# Patient Record
Sex: FEMALE | Employment: FULL TIME
[De-identification: names, ages, dates, MRNs, and addresses within clinical notes are randomized per-mention and may not be internally consistent; named-entity substitution may affect disease eponyms.]

---

## 2017-01-18 ENCOUNTER — TELEPHONE (OUTPATIENT)
Dept: FAMILY MEDICINE CLINIC | Facility: CLINIC | Age: 57
End: 2017-01-18

## 2017-01-19 RX ORDER — LEVOFLOXACIN 500 MG/1
500 TABLET, FILM COATED ORAL DAILY
Qty: 10 TABLET | Refills: 0 | Status: SHIPPED | OUTPATIENT
Start: 2017-01-19 | End: 2017-01-29

## 2017-01-19 RX ORDER — DEXTROMETHORPHAN HYDROBROMIDE AND PROMETHAZINE HYDROCHLORIDE 15; 6.25 MG/5ML; MG/5ML
5 SYRUP ORAL 4 TIMES DAILY PRN
Qty: 240 ML | Refills: 0 | Status: SHIPPED | OUTPATIENT
Start: 2017-01-19 | End: 2017-01-26

## 2017-02-14 RX ORDER — LEVOTHYROXINE SODIUM 0.1 MG/1
TABLET ORAL
Qty: 90 TABLET | Refills: 1 | Status: SHIPPED | OUTPATIENT
Start: 2017-02-14 | End: 2019-11-02

## 2017-05-04 DIAGNOSIS — E03.9 HYPOTHYROIDISM, UNSPECIFIED TYPE: Primary | ICD-10-CM

## 2017-05-04 RX ORDER — LEVOTHYROXINE SODIUM 0.15 MG/1
150 TABLET ORAL DAILY
Qty: 30 TABLET | Refills: 0 | Status: SHIPPED | OUTPATIENT
Start: 2017-05-04

## 2017-06-06 ENCOUNTER — OFFICE VISIT (OUTPATIENT)
Dept: FAMILY MEDICINE CLINIC | Age: 57
End: 2017-06-06
Payer: COMMERCIAL

## 2017-06-06 VITALS
BODY MASS INDEX: 24.8 KG/M2 | RESPIRATION RATE: 18 BRPM | WEIGHT: 158 LBS | TEMPERATURE: 98.6 F | HEIGHT: 67 IN | DIASTOLIC BLOOD PRESSURE: 95 MMHG | OXYGEN SATURATION: 98 % | HEART RATE: 83 BPM | SYSTOLIC BLOOD PRESSURE: 124 MMHG

## 2017-06-06 DIAGNOSIS — J30.9 ALLERGIC SINUSITIS: Primary | ICD-10-CM

## 2017-06-06 PROCEDURE — 3017F COLORECTAL CA SCREEN DOC REV: CPT | Performed by: PHYSICIAN ASSISTANT

## 2017-06-06 PROCEDURE — G8420 CALC BMI NORM PARAMETERS: HCPCS | Performed by: PHYSICIAN ASSISTANT

## 2017-06-06 PROCEDURE — 99212 OFFICE O/P EST SF 10 MIN: CPT | Performed by: PHYSICIAN ASSISTANT

## 2017-06-06 PROCEDURE — 4004F PT TOBACCO SCREEN RCVD TLK: CPT | Performed by: PHYSICIAN ASSISTANT

## 2017-06-06 PROCEDURE — 3014F SCREEN MAMMO DOC REV: CPT | Performed by: PHYSICIAN ASSISTANT

## 2017-06-06 PROCEDURE — G8427 DOCREV CUR MEDS BY ELIG CLIN: HCPCS | Performed by: PHYSICIAN ASSISTANT

## 2017-07-31 RX ORDER — ALBUTEROL SULFATE 90 UG/1
1-2 AEROSOL, METERED RESPIRATORY (INHALATION) EVERY 6 HOURS PRN
Qty: 3 INHALER | Refills: 0 | Status: SHIPPED | OUTPATIENT
Start: 2017-07-31

## 2019-11-02 ENCOUNTER — OFFICE VISIT (OUTPATIENT)
Dept: FAMILY MEDICINE CLINIC | Age: 59
End: 2019-11-02
Payer: COMMERCIAL

## 2019-11-02 ENCOUNTER — HOSPITAL ENCOUNTER (OUTPATIENT)
Age: 59
Setting detail: SPECIMEN
Discharge: HOME | End: 2019-11-02
Payer: COMMERCIAL

## 2019-11-02 VITALS
OXYGEN SATURATION: 98 % | HEART RATE: 74 BPM | TEMPERATURE: 98 F | WEIGHT: 146 LBS | DIASTOLIC BLOOD PRESSURE: 77 MMHG | RESPIRATION RATE: 18 BRPM | SYSTOLIC BLOOD PRESSURE: 134 MMHG | BODY MASS INDEX: 22.92 KG/M2

## 2019-11-02 DIAGNOSIS — J02.9 SORE THROAT: ICD-10-CM

## 2019-11-02 DIAGNOSIS — J06.9 VIRAL URI: Primary | ICD-10-CM

## 2019-11-02 LAB — S PYO AG THROAT QL: NORMAL

## 2019-11-02 PROCEDURE — 99202 OFFICE O/P NEW SF 15 MIN: CPT | Performed by: NURSE PRACTITIONER

## 2019-11-02 PROCEDURE — 87880 STREP A ASSAY W/OPTIC: CPT | Performed by: NURSE PRACTITIONER

## 2019-11-02 ASSESSMENT — ENCOUNTER SYMPTOMS
COUGH: 1
SHORTNESS OF BREATH: 0
RHINORRHEA: 1
DIARRHEA: 0
EYE DISCHARGE: 0
VOMITING: 0
WHEEZING: 0
SORE THROAT: 1

## 2019-11-04 LAB
DIRECT EXAM: NORMAL
Lab: NORMAL
SPECIMEN DESCRIPTION: NORMAL

## 2021-10-28 ENCOUNTER — OFFICE VISIT (OUTPATIENT)
Dept: PRIMARY CARE CLINIC | Age: 61
End: 2021-10-28
Payer: COMMERCIAL

## 2021-10-28 VITALS
BODY MASS INDEX: 22.76 KG/M2 | HEART RATE: 84 BPM | DIASTOLIC BLOOD PRESSURE: 84 MMHG | TEMPERATURE: 97.2 F | SYSTOLIC BLOOD PRESSURE: 131 MMHG | OXYGEN SATURATION: 98 % | HEIGHT: 67 IN | WEIGHT: 145 LBS

## 2021-10-28 DIAGNOSIS — J02.9 SORE THROAT: ICD-10-CM

## 2021-10-28 DIAGNOSIS — J40 BRONCHITIS: Primary | ICD-10-CM

## 2021-10-28 LAB — S PYO AG THROAT QL: NORMAL

## 2021-10-28 PROCEDURE — 99213 OFFICE O/P EST LOW 20 MIN: CPT | Performed by: NURSE PRACTITIONER

## 2021-10-28 PROCEDURE — 87880 STREP A ASSAY W/OPTIC: CPT | Performed by: NURSE PRACTITIONER

## 2021-10-28 RX ORDER — AZITHROMYCIN 250 MG/1
TABLET, FILM COATED ORAL
Qty: 1 PACKET | Refills: 0 | Status: SHIPPED | OUTPATIENT
Start: 2021-10-28

## 2021-10-28 RX ORDER — PREDNISONE 20 MG/1
40 TABLET ORAL DAILY
Qty: 10 TABLET | Refills: 0 | Status: SHIPPED | OUTPATIENT
Start: 2021-10-28 | End: 2021-11-02

## 2021-10-28 RX ORDER — BENZONATATE 200 MG/1
200 CAPSULE ORAL 3 TIMES DAILY PRN
Qty: 30 CAPSULE | Refills: 0 | Status: SHIPPED | OUTPATIENT
Start: 2021-10-28 | End: 2021-11-04

## 2021-10-28 ASSESSMENT — ENCOUNTER SYMPTOMS
SHORTNESS OF BREATH: 0
WHEEZING: 0
EYE DISCHARGE: 0
VOICE CHANGE: 0
COUGH: 1
SINUS PRESSURE: 0
CHEST TIGHTNESS: 0
SORE THROAT: 0
EYE REDNESS: 0

## 2021-10-28 NOTE — PATIENT INSTRUCTIONS
Patient Education        Bronchitis: Care Instructions  Your Care Instructions     Bronchitis is inflammation of the bronchial tubes, which carry air to the lungs. The tubes swell and produce mucus, or phlegm. The mucus and inflamed bronchial tubes make you cough. You may have trouble breathing. Most cases of bronchitis are caused by viruses like those that cause colds. Antibiotics usually do not help and they may be harmful. Bronchitis usually develops rapidly and lasts about 2 to 3 weeks in otherwise healthy people. Follow-up care is a key part of your treatment and safety. Be sure to make and go to all appointments, and call your doctor if you are having problems. It's also a good idea to know your test results and keep a list of the medicines you take. How can you care for yourself at home? · Take all medicines exactly as prescribed. Call your doctor if you think you are having a problem with your medicine. · Get some extra rest.  · Take an over-the-counter pain medicine, such as acetaminophen (Tylenol), ibuprofen (Advil, Motrin), or naproxen (Aleve) to reduce fever and relieve body aches. Read and follow all instructions on the label. · Do not take two or more pain medicines at the same time unless the doctor told you to. Many pain medicines have acetaminophen, which is Tylenol. Too much acetaminophen (Tylenol) can be harmful. · Take an over-the-counter cough medicine to help quiet a dry, hacking cough so that you can sleep. Avoid cough medicines that have more than one active ingredient. Read and follow all instructions on the label. · Do not smoke. Smoking can make bronchitis worse. If you need help quitting, talk to your doctor about stop-smoking programs and medicines. These can increase your chances of quitting for good. When should you call for help? Call 911 anytime you think you may need emergency care. For example, call if:    · You have severe trouble breathing.    Call your doctor now or seek immediate medical care if:    · You have new or worse trouble breathing.     · You cough up dark brown or bloody mucus (sputum).     · You have a new or higher fever.     · You have a new rash. Watch closely for changes in your health, and be sure to contact your doctor if:    · You cough more deeply or more often, especially if you notice more mucus or a change in the color of your mucus.     · You are not getting better as expected. Where can you learn more? Go to https://Captual.Picturelife. org and sign in to your Reel[employer]fer account. Enter H333 in the [employer] box to learn more about \"Bronchitis: Care Instructions. \"     If you do not have an account, please click on the \"Sign Up Now\" link. Current as of: July 6, 2021               Content Version: 13.0  © 7047-7260 Healthwise, Incorporated. Care instructions adapted under license by [facility] ([facility]). If you have questions about a medical condition or this instruction, always ask your healthcare professional. [address] any warranty or liability for your use of this information.

## 2021-12-09 ENCOUNTER — OFFICE VISIT (OUTPATIENT)
Dept: PRIMARY CARE CLINIC | Age: 61
End: 2021-12-09
Payer: COMMERCIAL

## 2021-12-09 VITALS
HEART RATE: 92 BPM | DIASTOLIC BLOOD PRESSURE: 82 MMHG | WEIGHT: 145 LBS | TEMPERATURE: 97.3 F | OXYGEN SATURATION: 96 % | BODY MASS INDEX: 22.76 KG/M2 | SYSTOLIC BLOOD PRESSURE: 131 MMHG | HEIGHT: 67 IN

## 2021-12-09 DIAGNOSIS — B96.89 ACUTE BACTERIAL SINUSITIS: Primary | ICD-10-CM

## 2021-12-09 DIAGNOSIS — J01.90 ACUTE BACTERIAL SINUSITIS: Primary | ICD-10-CM

## 2021-12-09 PROCEDURE — 99213 OFFICE O/P EST LOW 20 MIN: CPT | Performed by: NURSE PRACTITIONER

## 2021-12-09 RX ORDER — AZITHROMYCIN 250 MG/1
TABLET, FILM COATED ORAL
Qty: 1 PACKET | Refills: 0 | Status: SHIPPED | OUTPATIENT
Start: 2021-12-09

## 2021-12-09 RX ORDER — PREDNISONE 20 MG/1
40 TABLET ORAL DAILY
Qty: 10 TABLET | Refills: 0 | Status: SHIPPED | OUTPATIENT
Start: 2021-12-09 | End: 2021-12-14

## 2021-12-09 ASSESSMENT — PATIENT HEALTH QUESTIONNAIRE - PHQ9
1. LITTLE INTEREST OR PLEASURE IN DOING THINGS: 0
2. FEELING DOWN, DEPRESSED OR HOPELESS: 0
SUM OF ALL RESPONSES TO PHQ9 QUESTIONS 1 & 2: 0
SUM OF ALL RESPONSES TO PHQ QUESTIONS 1-9: 0

## 2021-12-09 ASSESSMENT — ENCOUNTER SYMPTOMS
SINUS PRESSURE: 1
CHEST TIGHTNESS: 0
VOICE CHANGE: 0
SORE THROAT: 0
WHEEZING: 0
COUGH: 1
EYE DISCHARGE: 0
SHORTNESS OF BREATH: 0
EYE REDNESS: 0

## 2021-12-09 NOTE — PROGRESS NOTES
[facility] [id number] [facility] [place] [facility]  [address] [address]  Dept: [phone]  Dept Fax: [phone]     Angel Thornton. Ledy Collins is a 59 y.o. female who presents to the urgent care today for her medicalconditions/complaints as noted below. Angel Thornton. Ledy Collins is c/o of Sinusitis (pt has been having sinus congestion and has been having slight cough with pain in her right earX 2 days )    HPI:      Sinusitis  This is a new problem. Episode onset: 2 days ago. The problem is unchanged. There has been no fever. Associated symptoms include congestion, coughing, ear pain (right), headaches and sinus pressure. Pertinent negatives include no chills, shortness of breath, sneezing or sore throat. Treatments tried: otc tx. The treatment provided no relief. Daughter also recently ill.     Past Medical History:   Diagnosis Date    GERD (gastroesophageal reflux disease)     Hypothyroidism     Irritable bowel syndrome     Postmenopausal     about 10 yrs ago      Current Outpatient Medications   Medication Sig Dispense Refill    azithromycin (ZITHROMAX Z-EMMA) 250 MG tablet Take 2 tabs on day 1 followed by 1 tab on days 2-5. 1 packet 0    predniSONE (DELTASONE) 20 MG tablet Take 2 tablets by mouth daily for 5 days 10 tablet 0    azithromycin (ZITHROMAX Z-EMMA) 250 MG tablet Take 2 tabs on day 1 followed by 1 tab on days 2-5. 1 packet 0    albuterol sulfate HFA (PROAIR HFA) 108 (90 Base) MCG/ACT inhaler Inhale 1-2 puffs into the lungs every 6 hours as needed for Wheezing 3 Inhaler 0    levothyroxine (LEVOTHROID) 150 MCG tablet Take 1 tablet by mouth daily 30 tablet 0    montelukast (SINGULAIR) 10 MG tablet TAKE 1 TABLET DAILY 90 tablet 3    metoclopramide (REGLAN) 10 MG tablet Take 1 tablet by mouth daily 90 tablet 3    alendronate (FOSAMAX) 35 MG tablet TAKE 1 TABLET BY MOUTH EVERY WEEK 4 tablet 2    fluticasone (FLONASE) 50 MCG/ACT nasal spray 1 spray by Nasal route daily 1 Bottle 3  Loratadine (CLARITIN) 10 MG CAPS Take 1 tablet by mouth daily. No current facility-administered medications for this visit. No Known Allergies    Reviewed PMH, SH, and FH with the patient and updated. Subjective:      Review of Systems   Constitutional: Negative for chills, fatigue and fever. HENT: Positive for congestion, ear pain (right), postnasal drip and sinus pressure. Negative for ear discharge, sneezing, sore throat and voice change. Eyes: Negative for discharge and redness. Respiratory: Positive for cough. Negative for chest tightness, shortness of breath and wheezing. Cardiovascular: Negative. Negative for chest pain. Musculoskeletal: Negative for myalgias. Skin: Negative for rash. Neurological: Positive for headaches. Negative for dizziness, weakness and light-headedness. Hematological: Negative for adenopathy. All other systems reviewed and are negative. Objective:      Physical Exam  Vitals and nursing note reviewed. Constitutional:       General: She is not in acute distress. Appearance: Normal appearance. She is well-developed. She is not ill-appearing, toxic-appearing or diaphoretic. HENT:      Head: Normocephalic. Right Ear: External ear normal. A middle ear effusion is present. Left Ear: Tympanic membrane and external ear normal.      Nose: Nose normal.      Right Sinus: No maxillary sinus tenderness or frontal sinus tenderness. Left Sinus: No maxillary sinus tenderness or frontal sinus tenderness. Mouth/Throat:      Pharynx: Oropharyngeal exudate (PNd) and posterior oropharyngeal erythema present. Eyes:      General:         Right eye: No discharge. Left eye: No discharge. Cardiovascular:      Rate and Rhythm: Normal rate and regular rhythm. Heart sounds: Normal heart sounds. No murmur heard. Pulmonary:      Effort: Pulmonary effort is normal. No respiratory distress.       Breath sounds: Normal breath sounds. No wheezing or rales. Lymphadenopathy:      Cervical: No cervical adenopathy. Skin:     General: Skin is warm. Findings: No rash. Neurological:      Mental Status: She is alert. /82 (Site: Left Upper Arm, Position: Sitting, Cuff Size: Medium Adult)   Pulse 92   Temp 97.3 °F (36.3 °C) (Tympanic)   Ht 5' 7\" (1.702 m)   Wt 145 lb (65.8 kg)   SpO2 96%   Breastfeeding No   BMI 22.71 kg/m²     Assessment:       Diagnosis Orders   1. Acute bacterial sinusitis  azithromycin (ZITHROMAX Z-EMMA) 250 MG tablet    predniSONE (DELTASONE) 20 MG tablet     Plan:      Based on the severity of the symptoms-- I will treat this as bacterial at this time. Patient instructed to complete antibiotic prescription fully. Prednisone sent to the pharmacy to help enable symptom relief. Patient declined COVID testing at this time. May use Motrin/Tylenol for fever/pain. Saline washes, salt water gargles and over the counter preparations if desired. Patient agreeable to treatment plan. Educational materials provided on AVS.  Follow up if symptoms do not improve/worsen. Orders Placed This Encounter   Medications    azithromycin (ZITHROMAX Z-EMMA) 250 MG tablet     Sig: Take 2 tabs on day 1 followed by 1 tab on days 2-5. Dispense:  1 packet     Refill:  0    predniSONE (DELTASONE) 20 MG tablet     Sig: Take 2 tablets by mouth daily for 5 days     Dispense:  10 tablet     Refill:  0        Patient given educational materials - see patient instructions. Discussed use, benefit, and side effects of prescribed medications. All patientquestions answered. Pt voiced understanding.     Electronically signed by HUNG Mendieta CNP on 12/9/2021at 9:09 AM

## 2021-12-09 NOTE — PATIENT INSTRUCTIONS

## 2022-02-01 ENCOUNTER — OFFICE VISIT (OUTPATIENT)
Dept: PRIMARY CARE CLINIC | Age: 62
End: 2022-02-01
Payer: COMMERCIAL

## 2022-02-01 VITALS
BODY MASS INDEX: 22.76 KG/M2 | HEART RATE: 97 BPM | HEIGHT: 67 IN | DIASTOLIC BLOOD PRESSURE: 85 MMHG | SYSTOLIC BLOOD PRESSURE: 124 MMHG | OXYGEN SATURATION: 94 % | TEMPERATURE: 98.4 F | WEIGHT: 145 LBS

## 2022-02-01 DIAGNOSIS — J01.90 ACUTE NON-RECURRENT SINUSITIS, UNSPECIFIED LOCATION: ICD-10-CM

## 2022-02-01 DIAGNOSIS — H66.90 ACUTE OTITIS MEDIA, UNSPECIFIED OTITIS MEDIA TYPE: Primary | ICD-10-CM

## 2022-02-01 PROCEDURE — 99213 OFFICE O/P EST LOW 20 MIN: CPT | Performed by: FAMILY MEDICINE

## 2022-02-01 RX ORDER — AMOXICILLIN AND CLAVULANATE POTASSIUM 875; 125 MG/1; MG/1
1 TABLET, FILM COATED ORAL 2 TIMES DAILY
Qty: 14 TABLET | Refills: 0 | Status: SHIPPED | OUTPATIENT
Start: 2022-02-01 | End: 2022-02-08

## 2022-02-01 ASSESSMENT — ENCOUNTER SYMPTOMS
COUGH: 0
VOMITING: 0
ABDOMINAL PAIN: 0
DIARRHEA: 1
SORE THROAT: 0

## 2022-02-01 NOTE — PROGRESS NOTES
[address] [place] [facility]  [address] [place] [address]  Dept: [phone]  Dept Fax: [phone]    Naz Harris is a 59 y.o. female who presents today for her medical conditions/complaintsas noted below. Naz Harris is c/o of Otalgia (pt ha been having ear pain on the right side since this morning)        HPI:     Otalgia   There is pain in the right ear. This is a new problem. The current episode started today. The problem occurs constantly. The problem has been unchanged. There has been no fever. Associated symptoms include diarrhea and hearing loss (muffled). Pertinent negatives include no abdominal pain, coughing, ear discharge, rash, sore throat or vomiting. Associated symptoms comments: headache. She has tried nothing for the symptoms. The treatment provided no relief. There is no history of a tympanostomy tube.    PMHx of allergies, recurrent ear infections  Patient declines covid testing at this time  Past Medical History:   Diagnosis Date    GERD (gastroesophageal reflux disease)     Hypothyroidism     Irritable bowel syndrome     Postmenopausal     about 10 yrs ago    Past medical history reviewed and pertinent positives/negatives in the HPI    Past Surgical History:   Procedure Laterality Date    ADENOIDECTOMY       SECTION       COLONOSCOPY  2011    normal    LAPAROSCOPY      OTHER SURGICAL HISTORY      plate in right arm    SMALL INTESTINE SURGERY      strangulated bowel    TONSILLECTOMY AND ADENOIDECTOMY  1963    UPPER GASTROINTESTINAL ENDOSCOPY  2010       Family History   Problem Relation Age of Onset    Other Mother     Other Father        Social History     Tobacco Use    Smoking status: Current Every Day Smoker     Packs/day: 0.50     Years: 30.00     Pack years: 15.00     Types: Cigarettes     Start date:     Smokeless tobacco: Never Used   Substance Use Topics    Alcohol use: Yes     Comment: social      Current Outpatient Medications   Medication Sig Dispense Refill    amoxicillin-clavulanate (AUGMENTIN) 875-125 MG per tablet Take 1 tablet by mouth 2 times daily for 7 days 14 tablet 0    azithromycin (ZITHROMAX Z-EMMA) 250 MG tablet Take 2 tabs on day 1 followed by 1 tab on days 2-5. 1 packet 0    azithromycin (ZITHROMAX Z-EMMA) 250 MG tablet Take 2 tabs on day 1 followed by 1 tab on days 2-5. 1 packet 0    albuterol sulfate HFA (PROAIR HFA) 108 (90 Base) MCG/ACT inhaler Inhale 1-2 puffs into the lungs every 6 hours as needed for Wheezing 3 Inhaler 0    levothyroxine (LEVOTHROID) 150 MCG tablet Take 1 tablet by mouth daily 30 tablet 0    montelukast (SINGULAIR) 10 MG tablet TAKE 1 TABLET DAILY 90 tablet 3    metoclopramide (REGLAN) 10 MG tablet Take 1 tablet by mouth daily 90 tablet 3    alendronate (FOSAMAX) 35 MG tablet TAKE 1 TABLET BY MOUTH EVERY WEEK 4 tablet 2    fluticasone (FLONASE) 50 MCG/ACT nasal spray 1 spray by Nasal route daily 1 Bottle 3    Loratadine (CLARITIN) 10 MG CAPS Take 1 tablet by mouth daily. No current facility-administered medications for this visit. No Known Allergies    Health Maintenance   Topic Date Due    Hepatitis C screen  Never done    HIV screen  Never done    Shingles Vaccine (1 of 2) Never done    TSH testing  12/08/2017    Cervical cancer screen  08/16/2019    Breast cancer screen  12/03/2020    Lipid screen  03/17/2021    Flu vaccine (1) 09/01/2021    Colon cancer screen colonoscopy  09/04/2022    Depression Screen  12/09/2022    Pneumococcal 0-64 years Vaccine (2 of 2 - PPSV23) 09/04/2023    DTaP/Tdap/Td vaccine (2 - Td or Tdap) 09/01/2030    COVID-19 Vaccine  Completed    Hepatitis A vaccine  Aged Out    Hepatitis B vaccine  Aged Out    Hib vaccine  Aged Out    Meningococcal (ACWY) vaccine  Aged Out       :      Review of Systems   Constitutional: Positive for chills.  Negative for fever.   HENT: Positive for congestion, ear pain and hearing loss (muffled). Negative for ear discharge and sore throat. Respiratory: Negative for cough. Gastrointestinal: Positive for diarrhea. Negative for abdominal pain and vomiting. Skin: Negative for rash. Objective:     Physical Exam  Vitals and nursing note reviewed. Constitutional:       Appearance: Normal appearance. She is normal weight. HENT:      Head: Normocephalic and atraumatic. Right Ear: Hearing, ear canal and external ear normal. A middle ear effusion is present. Tympanic membrane is erythematous and bulging. Left Ear: Hearing, tympanic membrane, ear canal and external ear normal.      Nose: Congestion present. Right Sinus: Maxillary sinus tenderness and frontal sinus tenderness present. Mouth/Throat:      Lips: Pink. Mouth: Mucous membranes are moist.      Pharynx: Oropharynx is clear. Eyes:      Extraocular Movements: Extraocular movements intact. Conjunctiva/sclera: Conjunctivae normal.   Cardiovascular:      Rate and Rhythm: Normal rate. Pulmonary:      Effort: Pulmonary effort is normal.   Musculoskeletal:      Cervical back: Normal range of motion. No muscular tenderness. Skin:     General: Skin is warm and dry. Neurological:      Mental Status: She is alert and oriented to person, place, and time. Mental status is at baseline. Psychiatric:         Mood and Affect: Mood normal.         Behavior: Behavior normal.         Thought Content: Thought content normal.         Judgment: Judgment normal.       /85 (Site: Left Upper Arm, Position: Sitting, Cuff Size: Medium Adult)   Pulse 97   Temp 98.4 °F (36.9 °C) (Tympanic)   Ht 5' 7\" (1.702 m)   Wt 145 lb (65.8 kg)   SpO2 94%   Breastfeeding No   BMI 22.71 kg/m²     Assessment:       Diagnosis Orders   1. Acute otitis media, unspecified otitis media type     2.  Acute non-recurrent sinusitis, unspecified location         Plan: Take Augmentin as prescribed for ear/sinus infection  Continue over the counter cold medications such as decongestants, allergy medications, flonase as needed fir symptoms  If symptoms worsen or do not improve please follow-up with PCP or return to clinic    No orders of the defined types were placed in this encounter. Orders Placed This Encounter   Medications    amoxicillin-clavulanate (AUGMENTIN) 875-125 MG per tablet     Sig: Take 1 tablet by mouth 2 times daily for 7 days     Dispense:  14 tablet     Refill:  0      Patient given educational materials - see patient instructions. Discussed use, benefit, and side effects of prescribed medications. All patient questions answered. Pt voiced understanding. Patient agreed with treatment plan. Follow up as directed.      Electronicallysigned by Beck Raymond MD on 2/1/2022 at 8:55 AM

## 2022-02-01 NOTE — PATIENT INSTRUCTIONS
Patient Education        Earache: Care Instructions  Your Care Instructions     Even though infection is a common cause of ear pain, not all ear pain means an infection. If you have ear pain and don't have an infection, it could be because of a jaw problem, such as temporomandibular joint (TMJ) pain. Or it could be because of a neck problem. When ear discomfort or pain is mild or comes and goes without other symptoms, home treatment may be all you need. Follow-up care is a key part of your treatment and safety. Be sure to make and go to all appointments, and call your doctor if you are having problems. It's also a good idea to know your test results and keep a list of the medicines you take. How can you care for yourself at home? · Apply heat on the ear to ease pain. To apply heat, put a warm water bottle, a heating pad set on low, or a warm cloth on your ear. Do not go to sleep with a heating pad on your skin. · Take an over-the-counter pain medicine, such as acetaminophen (Tylenol), ibuprofen (Advil, Motrin), or naproxen (Aleve). Be safe with medicines. Read and follow all instructions on the label. · Do not take two or more pain medicines at the same time unless the doctor told you to. Many pain medicines have acetaminophen, which is Tylenol. Too much acetaminophen (Tylenol) can be harmful. · Never insert anything, such as a cotton swab or a kimberli pin, into the ear. When should you call for help? Call your doctor now or seek immediate medical care if:    · You have new or worse symptoms of infection, such as:  ? Increased pain, swelling, warmth, or redness. ? Red streaks leading from the area. ? Pus draining from the area. ? A fever. Watch closely for changes in your health, and be sure to contact your doctor if:    · You have new or worse discharge coming from the ear.     · You do not get better as expected. Where can you learn more? Go to https://rashard.health-partners. org and sign in to your Communicado account. Enter A332 in the [facility] box to learn more about \"Earache: Care Instructions. \"     If you do not have an account, please click on the \"Sign Up Now\" link. Current as of: September 8, 2021               Content Version: 13.1  © 5095-9656 Healthwise, [employer]. Care instructions adapted under license by [facility] ([facility]). If you have questions about a medical condition or this instruction, always ask your healthcare professional. [address] any warranty or liability for your use of this information.        Take Augmentin as prescribed for ear/sinus infection  Continue over the counter cold medications such as decongestants, allergy medications, flonase as needed fir symptoms  If symptoms worsen or do not improve please follow-up with PCP or return to clinic

## 2025-06-20 ENCOUNTER — OFFICE VISIT (OUTPATIENT)
Age: 65
End: 2025-06-20

## 2025-06-20 VITALS
HEIGHT: 68 IN | DIASTOLIC BLOOD PRESSURE: 85 MMHG | HEART RATE: 82 BPM | RESPIRATION RATE: 18 BRPM | BODY MASS INDEX: 21.22 KG/M2 | OXYGEN SATURATION: 96 % | SYSTOLIC BLOOD PRESSURE: 147 MMHG | WEIGHT: 140 LBS

## 2025-06-20 DIAGNOSIS — J32.4 PANSINUSITIS, UNSPECIFIED CHRONICITY: Primary | ICD-10-CM

## 2025-06-20 DIAGNOSIS — R03.0 ELEVATED BLOOD PRESSURE READING: ICD-10-CM

## 2025-06-20 ASSESSMENT — ENCOUNTER SYMPTOMS
SINUS PRESSURE: 1
SHORTNESS OF BREATH: 0
HOARSE VOICE: 0
COUGH: 0
SORE THROAT: 0
SWOLLEN GLANDS: 0
SINUS PAIN: 1

## 2025-06-20 NOTE — PROGRESS NOTES
[facility] [place] [address]  Phone: [phone]  Fax: [phone]      Rossana Salinas  1960  MRN: [id number]  Date of visit: 2025    Chief Complaint:     Rossana Salinas (:  1960) is a 64 y.o. female,Established patient, here for evaluation of the following chief complaint(s):  Facial Pain      No Known Allergies     Current Outpatient Medications   Medication Sig Dispense Refill    amoxicillin-clavulanate (AUGMENTIN) 875-125 MG per tablet Take 1 tablet by mouth 2 times daily for 10 days 20 tablet 0    levothyroxine (LEVOTHROID) 150 MCG tablet Take 1 tablet by mouth daily 30 tablet 0    montelukast (SINGULAIR) 10 MG tablet TAKE 1 TABLET DAILY 90 tablet 3    Loratadine (CLARITIN) 10 MG CAPS Take 1 tablet by mouth daily.        methylPREDNISolone (MEDROL DOSEPACK) 4 MG tablet Take by mouth. (Patient not taking: Reported on 2025) 21 tablet 1    azithromycin (ZITHROMAX Z-EMMA) 250 MG tablet Take 2 tabs on day 1 followed by 1 tab on days 2-5. (Patient not taking: Reported on 2025) 1 packet 0    azithromycin (ZITHROMAX Z-EMMA) 250 MG tablet Take 2 tabs on day 1 followed by 1 tab on days 2-5. (Patient not taking: Reported on 2025) 1 packet 0    albuterol sulfate HFA (PROAIR HFA) 108 (90 Base) MCG/ACT inhaler Inhale 1-2 puffs into the lungs every 6 hours as needed for Wheezing (Patient not taking: Reported on 2025) 3 Inhaler 0    metoclopramide (REGLAN) 10 MG tablet Take 1 tablet by mouth daily (Patient not taking: Reported on 6/15/2025) 90 tablet 3    alendronate (FOSAMAX) 35 MG tablet TAKE 1 TABLET BY MOUTH EVERY WEEK (Patient not taking: Reported on 2025) 4 tablet 2    fluticasone (FLONASE) 50 MCG/ACT nasal spray 1 spray by Nasal route daily (Patient not taking: Reported on 2025) 1 Bottle 3     No current facility-administered medications for this visit.        Past Medical History:   Diagnosis Date    GERD